# Patient Record
Sex: FEMALE | Race: WHITE | NOT HISPANIC OR LATINO | Employment: FULL TIME | URBAN - METROPOLITAN AREA
[De-identification: names, ages, dates, MRNs, and addresses within clinical notes are randomized per-mention and may not be internally consistent; named-entity substitution may affect disease eponyms.]

---

## 2019-06-06 ENCOUNTER — HOSPITAL ENCOUNTER (OUTPATIENT)
Dept: MAMMOGRAPHY | Facility: CLINIC | Age: 44
Discharge: HOME | End: 2019-06-06
Attending: NURSE PRACTITIONER | Admitting: NURSE PRACTITIONER
Payer: COMMERCIAL

## 2019-06-06 ENCOUNTER — OFFICE VISIT (OUTPATIENT)
Dept: INTERNAL MEDICINE | Facility: CLINIC | Age: 44
End: 2019-06-06
Payer: COMMERCIAL

## 2019-06-06 VITALS
HEIGHT: 67 IN | SYSTOLIC BLOOD PRESSURE: 102 MMHG | RESPIRATION RATE: 20 BRPM | TEMPERATURE: 98.1 F | WEIGHT: 146 LBS | DIASTOLIC BLOOD PRESSURE: 64 MMHG | OXYGEN SATURATION: 98 % | BODY MASS INDEX: 22.91 KG/M2 | HEART RATE: 78 BPM

## 2019-06-06 DIAGNOSIS — Z12.31 ENCOUNTER FOR SCREENING MAMMOGRAM FOR BREAST CANCER: ICD-10-CM

## 2019-06-06 DIAGNOSIS — Z00.01 ENCOUNTER FOR GENERAL ADULT MEDICAL EXAMINATION WITH ABNORMAL FINDINGS: Primary | ICD-10-CM

## 2019-06-06 DIAGNOSIS — F33.0 MILD EPISODE OF RECURRENT MAJOR DEPRESSIVE DISORDER (H): ICD-10-CM

## 2019-06-06 DIAGNOSIS — F41.9 ANXIETY: ICD-10-CM

## 2019-06-06 LAB
BASOPHILS # BLD AUTO: 0 10E9/L (ref 0–0.2)
BASOPHILS NFR BLD AUTO: 1 %
DIFFERENTIAL METHOD BLD: ABNORMAL
EOSINOPHIL # BLD AUTO: 0.1 10E9/L (ref 0–0.7)
EOSINOPHIL NFR BLD AUTO: 3 %
ERYTHROCYTE [DISTWIDTH] IN BLOOD BY AUTOMATED COUNT: 12.4 % (ref 10–15)
HCT VFR BLD AUTO: 40.7 % (ref 35–47)
HGB BLD-MCNC: 13.5 G/DL (ref 11.7–15.7)
LYMPHOCYTES # BLD AUTO: 1.8 10E9/L (ref 0.8–5.3)
LYMPHOCYTES NFR BLD AUTO: 60 %
MCH RBC QN AUTO: 30.5 PG (ref 26.5–33)
MCHC RBC AUTO-ENTMCNC: 33.2 G/DL (ref 31.5–36.5)
MCV RBC AUTO: 92 FL (ref 78–100)
MONOCYTES # BLD AUTO: 0.3 10E9/L (ref 0–1.3)
MONOCYTES NFR BLD AUTO: 11 %
NEUTROPHILS # BLD AUTO: 0.8 10E9/L (ref 1.6–8.3)
NEUTROPHILS NFR BLD AUTO: 25 %
PLATELET # BLD AUTO: 202 10E9/L (ref 150–450)
PLATELET # BLD EST: ABNORMAL 10*3/UL
RBC # BLD AUTO: 4.43 10E12/L (ref 3.8–5.2)
RBC MORPH BLD: ABNORMAL
WBC # BLD AUTO: 3 10E9/L (ref 4–11)

## 2019-06-06 PROCEDURE — 77067 SCR MAMMO BI INCL CAD: CPT

## 2019-06-06 PROCEDURE — 99386 PREV VISIT NEW AGE 40-64: CPT | Performed by: NURSE PRACTITIONER

## 2019-06-06 PROCEDURE — G0123 SCREEN CERV/VAG THIN LAYER: HCPCS | Performed by: NURSE PRACTITIONER

## 2019-06-06 PROCEDURE — 80061 LIPID PANEL: CPT | Performed by: NURSE PRACTITIONER

## 2019-06-06 PROCEDURE — 87624 HPV HI-RISK TYP POOLED RSLT: CPT | Performed by: NURSE PRACTITIONER

## 2019-06-06 PROCEDURE — 80050 GENERAL HEALTH PANEL: CPT | Performed by: NURSE PRACTITIONER

## 2019-06-06 PROCEDURE — 36415 COLL VENOUS BLD VENIPUNCTURE: CPT | Performed by: NURSE PRACTITIONER

## 2019-06-06 RX ORDER — BUPROPION HYDROCHLORIDE 300 MG/1
300 TABLET ORAL EVERY MORNING
COMMUNITY
End: 2019-07-15

## 2019-06-06 RX ORDER — CITALOPRAM HYDROBROMIDE 40 MG/1
40 TABLET ORAL DAILY
COMMUNITY
End: 2019-07-15

## 2019-06-06 SDOH — HEALTH STABILITY: MENTAL HEALTH: HOW OFTEN DO YOU HAVE A DRINK CONTAINING ALCOHOL?: MONTHLY OR LESS

## 2019-06-06 ASSESSMENT — ENCOUNTER SYMPTOMS
FEVER: 0
PARESTHESIAS: 0
WEAKNESS: 1
JOINT SWELLING: 0
NERVOUS/ANXIOUS: 1
DYSURIA: 0
SHORTNESS OF BREATH: 0
FREQUENCY: 0
CONSTIPATION: 0
MYALGIAS: 0
HEARTBURN: 0
HEADACHES: 0
DIARRHEA: 0
EYE PAIN: 0
ABDOMINAL PAIN: 0
DIZZINESS: 0
CHILLS: 0
ARTHRALGIAS: 0
BREAST MASS: 0
HEMATOCHEZIA: 0
SORE THROAT: 0
NAUSEA: 0
COUGH: 0
PALPITATIONS: 0
HEMATURIA: 0

## 2019-06-06 ASSESSMENT — MIFFLIN-ST. JEOR: SCORE: 1339.88

## 2019-06-06 ASSESSMENT — ANXIETY QUESTIONNAIRES
5. BEING SO RESTLESS THAT IT IS HARD TO SIT STILL: NOT AT ALL
2. NOT BEING ABLE TO STOP OR CONTROL WORRYING: SEVERAL DAYS
1. FEELING NERVOUS, ANXIOUS, OR ON EDGE: SEVERAL DAYS
IF YOU CHECKED OFF ANY PROBLEMS ON THIS QUESTIONNAIRE, HOW DIFFICULT HAVE THESE PROBLEMS MADE IT FOR YOU TO DO YOUR WORK, TAKE CARE OF THINGS AT HOME, OR GET ALONG WITH OTHER PEOPLE: SOMEWHAT DIFFICULT
7. FEELING AFRAID AS IF SOMETHING AWFUL MIGHT HAPPEN: NOT AT ALL
GAD7 TOTAL SCORE: 5
3. WORRYING TOO MUCH ABOUT DIFFERENT THINGS: SEVERAL DAYS
6. BECOMING EASILY ANNOYED OR IRRITABLE: SEVERAL DAYS

## 2019-06-06 ASSESSMENT — PATIENT HEALTH QUESTIONNAIRE - PHQ9
5. POOR APPETITE OR OVEREATING: SEVERAL DAYS
SUM OF ALL RESPONSES TO PHQ QUESTIONS 1-9: 8

## 2019-06-06 NOTE — PROGRESS NOTES
SUBJECTIVE:   CC: Candice Chopra is an 44 year old woman who presents for preventive health visit.     Healthy Habits:     Getting at least 3 servings of Calcium per day:  Yes    Bi-annual eye exam:  Yes    Dental care twice a year:  Yes    Sleep apnea or symptoms of sleep apnea:  None    Diet:  Vegetarian/vegan    Frequency of exercise:  4-5 days/week    Duration of exercise:  30-45 minutes    Taking medications regularly:  Yes    Medication side effects:  None    PHQ-2 Total Score: 0    Additional concerns today:  No              Today's PHQ-2 Score:   PHQ-2 ( 1999 Pfizer) 6/6/2019   Q1: Little interest or pleasure in doing things 0   Q2: Feeling down, depressed or hopeless 0   PHQ-2 Score 0   Q1: Little interest or pleasure in doing things Not at all   Q2: Feeling down, depressed or hopeless Not at all   PHQ-2 Score 0       Abuse: Current or Past(Physical, Sexual or Emotional)- No  Do you feel safe in your environment? Yes    Social History     Tobacco Use     Smoking status: Never Smoker     Smokeless tobacco: Never Used   Substance Use Topics     Alcohol use: Yes     Frequency: Monthly or less         Alcohol Use 6/6/2019   Prescreen: >3 drinks/day or >7 drinks/week? No       Reviewed orders with patient.  Reviewed health maintenance and updated orders accordingly - Yes          Pertinent mammograms are reviewed under the imaging tab.  History of abnormal Pap smear: NO - age 30-65 PAP every 5 years with negative HPV co-testing recommended     Reviewed and updated as needed this visit by clinical staff  Tobacco  Allergies  Meds  Med Hx  Surg Hx  Fam Hx  Soc Hx        Reviewed and updated as needed this visit by Provider  Allergies            Review of Systems   Constitutional: Negative for chills and fever.   HENT: Negative for congestion, ear pain, hearing loss and sore throat.    Eyes: Negative for pain and visual disturbance.   Respiratory: Negative for cough and shortness of breath.   "  Cardiovascular: Negative for chest pain, palpitations and peripheral edema.   Gastrointestinal: Negative for abdominal pain, constipation, diarrhea, heartburn, hematochezia and nausea.   Breasts:  Negative for tenderness, breast mass and discharge.   Genitourinary: Negative for dysuria, frequency, genital sores, hematuria, pelvic pain, urgency, vaginal bleeding and vaginal discharge.   Musculoskeletal: Negative for arthralgias, joint swelling and myalgias.   Skin: Negative for rash.   Neurological: Positive for weakness. Negative for dizziness, headaches and paresthesias.   Psychiatric/Behavioral: Negative for mood changes. The patient is nervous/anxious.      Needs referral to psych   Has been on same medication for a long time     PHQ-9 SCORE 6/6/2019   PHQ-9 Total Score 8     MICHAEL-7 SCORE 6/6/2019   Total Score 5     Periods more frequent than usual  Has an appointment       PAP done and she is at end of period so old blood present         OBJECTIVE:   /64   Pulse 78   Temp 98.1  F (36.7  C) (Oral)   Resp 20   Ht 1.702 m (5' 7\")   Wt 66.2 kg (146 lb)   LMP 06/01/2019 (Approximate)   SpO2 98%   Breastfeeding? No   BMI 22.87 kg/m    Physical Exam  GENERAL: alert and no distress  EYES: Eyes grossly normal to inspection, and conjunctivae and sclerae normal  HENT: ear canals and TM's normal, nose and mouth without ulcers or lesions  NECK: no adenopathy, no asymmetry, masses, or scars and thyroid normal to palpation  RESP: lungs clear to auscultation - no rales, rhonchi or wheezes  BREAST: normal without masses, tenderness or nipple discharge and no palpable axillary masses or adenopathy  CV: regular rate and rhythm, normal S1 S2, no S3 or S4, no murmur, click or rub, no peripheral edema and peripheral pulses strong  ABDOMEN: soft, nontender, no hepatosplenomegaly, no masses and bowel sounds normal   (female): normal female external genitalia, normal urethral meatus, vaginal mucosa pink, moist, well " "rugated, and normal cervix/adnexa/uterus without masses or discharge  MS: no gross musculoskeletal defects noted, no edema  SKIN: no suspicious lesions or rashes  NEURO: Normal strength and tone, mentation intact and speech normal  PSYCH: mentation appears normal, affect normal/bright    Diagnostic Test Results:  Labs reviewed in [facility]  Lab     ASSESSMENT/PLAN:   1. Encounter for general adult medical examination with abnormal findings    - SCREENING PAP SMEAR  - CBC with platelets and differential  - Lipid panel reflex to direct LDL Fasting  - Comprehensive metabolic panel  - TSH with free T4 reflex  - Pap imaged thin layer screen with HPV - recommended age 30 - 65 years (select HPV order below)    2. Mild episode of recurrent major depressive disorder (H)  Wants referral to mental health   She is currently on wellbutrin XL and celexa and doing well   - MENTAL HEALTH REFERRAL  - Adult; Psychiatry and Medication Management; Psychiatry; Other: [facility] [phone]; We will contact you to schedule the appointment or please call with any questions  - DEPRESSION ACTION PLAN (DAP)    3. Encounter for screening mammogram for breast cancer    - *MA Screening Digital Bilateral; Future    4. Anxiety  As above - on medication and doing well   - MENTAL HEALTH REFERRAL  - Adult; Psychiatry and Medication Management; Psychiatry; Other: [facility] [phone]; We will contact you to schedule the appointment or please call with any questions  - DEPRESSION ACTION PLAN (DAP)    COUNSELING:  Reviewed preventive health counseling, as reflected in patient instructions       Regular exercise       Healthy diet/nutrition       Osteoporosis Prevention/Bone Health    Estimated body mass index is 22.87 kg/m  as calculated from the following:    Height as of this encounter: 1.702 m (5' 7\").    Weight as of this encounter: 66.2 kg (146 lb).         reports that she has never smoked. She has " never used smokeless tobacco.      Counseling Resources:  ATP IV Guidelines  Pooled Cohorts Equation Calculator  Breast Cancer Risk Calculator  FRAX Risk Assessment  ICSI Preventive Guidelines  Dietary Guidelines for Americans, 2010  USDA's MyPlate  ASA Prophylaxis  Lung CA Screening    MAAME Collado CNP  [facility]

## 2019-06-06 NOTE — NURSING NOTE
"Chief Complaint   Patient presents with     Physical     fasting,pap     initial /64   Pulse 78   Temp 98.1  F (36.7  C) (Oral)   Resp 20   Ht 1.702 m (5' 7\")   Wt 66.2 kg (146 lb)   LMP 06/01/2019 (Approximate)   SpO2 98%   Breastfeeding? No   BMI 22.87 kg/m   Estimated body mass index is 22.87 kg/m  as calculated from the following:    Height as of this encounter: 1.702 m (5' 7\").    Weight as of this encounter: 66.2 kg (146 lb)..  bp completed using cuff size regular  SALVADOR CHASE LPN  "

## 2019-06-06 NOTE — PATIENT INSTRUCTIONS
Lab in [address]    To schedule your mammogram, you may call the breast center at [phone].     Mental health referral

## 2019-06-07 LAB
ALBUMIN SERPL-MCNC: 3.8 G/DL (ref 3.4–5)
ALP SERPL-CCNC: 55 U/L (ref 40–150)
ALT SERPL W P-5'-P-CCNC: 24 U/L (ref 0–50)
ANION GAP SERPL CALCULATED.3IONS-SCNC: 7 MMOL/L (ref 3–14)
AST SERPL W P-5'-P-CCNC: 19 U/L (ref 0–45)
BILIRUB SERPL-MCNC: 0.5 MG/DL (ref 0.2–1.3)
BUN SERPL-MCNC: 6 MG/DL (ref 7–30)
CALCIUM SERPL-MCNC: 8.6 MG/DL (ref 8.5–10.1)
CHLORIDE SERPL-SCNC: 109 MMOL/L (ref 94–109)
CHOLEST SERPL-MCNC: 153 MG/DL
CO2 SERPL-SCNC: 24 MMOL/L (ref 20–32)
CREAT SERPL-MCNC: 0.88 MG/DL (ref 0.52–1.04)
GFR SERPL CREATININE-BSD FRML MDRD: 79 ML/MIN/{1.73_M2}
GLUCOSE SERPL-MCNC: 84 MG/DL (ref 70–99)
HDLC SERPL-MCNC: 56 MG/DL
LDLC SERPL CALC-MCNC: 85 MG/DL
NONHDLC SERPL-MCNC: 97 MG/DL
POTASSIUM SERPL-SCNC: 4 MMOL/L (ref 3.4–5.3)
PROT SERPL-MCNC: 6.6 G/DL (ref 6.8–8.8)
SODIUM SERPL-SCNC: 140 MMOL/L (ref 133–144)
TRIGL SERPL-MCNC: 58 MG/DL
TSH SERPL DL<=0.005 MIU/L-ACNC: 0.94 MU/L (ref 0.4–4)

## 2019-06-07 ASSESSMENT — ANXIETY QUESTIONNAIRES: GAD7 TOTAL SCORE: 5

## 2019-06-11 LAB
COPATH REPORT: NORMAL
PAP: NORMAL

## 2019-06-13 LAB
FINAL DIAGNOSIS: NORMAL
HPV HR 12 DNA CVX QL NAA+PROBE: NEGATIVE
HPV16 DNA SPEC QL NAA+PROBE: NEGATIVE
HPV18 DNA SPEC QL NAA+PROBE: NEGATIVE
SPECIMEN DESCRIPTION: NORMAL
SPECIMEN SOURCE CVX/VAG CYTO: NORMAL

## 2019-06-14 DIAGNOSIS — D72.9 ABNORMAL WHITE BLOOD CELL (WBC) COUNT: Primary | ICD-10-CM

## 2019-07-15 ENCOUNTER — OFFICE VISIT (OUTPATIENT)
Dept: INTERNAL MEDICINE | Facility: CLINIC | Age: 44
End: 2019-07-15
Payer: COMMERCIAL

## 2019-07-15 VITALS
RESPIRATION RATE: 20 BRPM | HEART RATE: 85 BPM | DIASTOLIC BLOOD PRESSURE: 80 MMHG | BODY MASS INDEX: 22.24 KG/M2 | SYSTOLIC BLOOD PRESSURE: 112 MMHG | OXYGEN SATURATION: 100 % | TEMPERATURE: 98.3 F | WEIGHT: 142 LBS

## 2019-07-15 DIAGNOSIS — F41.1 GAD (GENERALIZED ANXIETY DISORDER): ICD-10-CM

## 2019-07-15 DIAGNOSIS — H61.21 IMPACTED CERUMEN OF RIGHT EAR: ICD-10-CM

## 2019-07-15 DIAGNOSIS — H91.91 DIMINISHED HEARING, RIGHT: Primary | ICD-10-CM

## 2019-07-15 PROCEDURE — 69210 REMOVE IMPACTED EAR WAX UNI: CPT | Mod: RT | Performed by: INTERNAL MEDICINE

## 2019-07-15 PROCEDURE — 99213 OFFICE O/P EST LOW 20 MIN: CPT | Mod: 25 | Performed by: INTERNAL MEDICINE

## 2019-07-15 RX ORDER — BUPROPION HYDROCHLORIDE 300 MG/1
300 TABLET ORAL EVERY MORNING
Qty: 30 TABLET | Refills: 0 | Status: SHIPPED | OUTPATIENT
Start: 2019-07-15 | End: 2021-04-26

## 2019-07-15 RX ORDER — CITALOPRAM HYDROBROMIDE 40 MG/1
40 TABLET ORAL DAILY
Qty: 30 TABLET | Refills: 0 | Status: SHIPPED | OUTPATIENT
Start: 2019-07-15

## 2019-07-15 ASSESSMENT — PATIENT HEALTH QUESTIONNAIRE - PHQ9
SUM OF ALL RESPONSES TO PHQ QUESTIONS 1-9: 4
5. POOR APPETITE OR OVEREATING: NOT AT ALL

## 2019-07-15 ASSESSMENT — ANXIETY QUESTIONNAIRES
5. BEING SO RESTLESS THAT IT IS HARD TO SIT STILL: NOT AT ALL
3. WORRYING TOO MUCH ABOUT DIFFERENT THINGS: SEVERAL DAYS
IF YOU CHECKED OFF ANY PROBLEMS ON THIS QUESTIONNAIRE, HOW DIFFICULT HAVE THESE PROBLEMS MADE IT FOR YOU TO DO YOUR WORK, TAKE CARE OF THINGS AT HOME, OR GET ALONG WITH OTHER PEOPLE: NOT DIFFICULT AT ALL
1. FEELING NERVOUS, ANXIOUS, OR ON EDGE: SEVERAL DAYS
2. NOT BEING ABLE TO STOP OR CONTROL WORRYING: SEVERAL DAYS
7. FEELING AFRAID AS IF SOMETHING AWFUL MIGHT HAPPEN: NOT AT ALL
6. BECOMING EASILY ANNOYED OR IRRITABLE: SEVERAL DAYS
GAD7 TOTAL SCORE: 4

## 2019-07-15 NOTE — PROGRESS NOTES
SUBJECTIVE:                                                      HPI: Candice Chopra is a pleasant 44 year old female who presents with muffled hearing:    - right-sided  - ongoing for the last 3 weeks  - no precipitating flight or underwater swimming  - has tried Debrox and OTC rinses-no improvement    - no ear pressure or pain  - no fevers or chills  - no current URI or allergy symptoms  - no ringing    Unrelated to above, patient needs refills of Celexa and Wellbutrin until she follows up with her PCP next month.  Anxiety well-controlled on current medications.    The medication, allergy, and problem lists have been reviewed and updated as appropriate.     OBJECTIVE:                                                      /80   Pulse 85   Temp 98.3  F (36.8  C) (Oral)   Resp 20   Wt 64.4 kg (142 lb)   LMP 07/01/2019 (Within Days)   SpO2 100%   BMI 22.24 kg/m    Constitutional: well-appearing  Right ear: normal pinna; unable to visualize EAC and TM due to impacted cerumen  Left ear: normal pinna, EAC, and TM    After obtaining verbal permission, a moderate amount of cerumen was removed via irrigation and lighted curette (by MD).    After procedure right EAC and TM visualized and normal. Patient reports resolution of presenting symptoms.    ASSESSMENT/PLAN:                                                      (H91.91) Diminished hearing, right  (primary encounter diagnosis)  (H61.21) Impacted cerumen of right ear  Comment: diminished hearing due to impacted cerumen; resolved with cerumen removal.    (F41.1) MICHAEL (generalized anxiety disorder)  Comment: well-controlled on Wellbutrin XL and Celexa.  Plan: CPM for now; refills provided; following up with PCP next month    The instructions on the AVS were discussed and explained to the patient. Patient expressed understanding of instructions.    (Chart documentation was completed, in part, with Eponym voice-recognition software. Even though reviewed, some  grammatical, spelling, and word errors may remain.)    Ashley Pedroza MD   [address] [address]  T: [phone], F: [phone]

## 2019-07-16 ASSESSMENT — ANXIETY QUESTIONNAIRES: GAD7 TOTAL SCORE: 4

## 2019-10-17 DIAGNOSIS — D72.9 ABNORMAL WHITE BLOOD CELL (WBC) COUNT: ICD-10-CM

## 2019-10-17 LAB
BASOPHILS # BLD AUTO: 0 10E9/L (ref 0–0.2)
BASOPHILS NFR BLD AUTO: 0.7 %
DIFFERENTIAL METHOD BLD: ABNORMAL
EOSINOPHIL # BLD AUTO: 0.2 10E9/L (ref 0–0.7)
EOSINOPHIL NFR BLD AUTO: 5.5 %
ERYTHROCYTE [DISTWIDTH] IN BLOOD BY AUTOMATED COUNT: 12.5 % (ref 10–15)
HCT VFR BLD AUTO: 40.3 % (ref 35–47)
HGB BLD-MCNC: 13.2 G/DL (ref 11.7–15.7)
LYMPHOCYTES # BLD AUTO: 1.7 10E9/L (ref 0.8–5.3)
LYMPHOCYTES NFR BLD AUTO: 56.3 %
MCH RBC QN AUTO: 31 PG (ref 26.5–33)
MCHC RBC AUTO-ENTMCNC: 32.8 G/DL (ref 31.5–36.5)
MCV RBC AUTO: 95 FL (ref 78–100)
MONOCYTES # BLD AUTO: 0.3 10E9/L (ref 0–1.3)
MONOCYTES NFR BLD AUTO: 10.2 %
NEUTROPHILS # BLD AUTO: 0.8 10E9/L (ref 1.6–8.3)
NEUTROPHILS NFR BLD AUTO: 27.3 %
PLATELET # BLD AUTO: 212 10E9/L (ref 150–450)
RBC # BLD AUTO: 4.26 10E12/L (ref 3.8–5.2)
WBC # BLD AUTO: 2.9 10E9/L (ref 4–11)

## 2019-10-17 PROCEDURE — 85025 COMPLETE CBC W/AUTO DIFF WBC: CPT | Performed by: NURSE PRACTITIONER

## 2019-10-17 PROCEDURE — 36415 COLL VENOUS BLD VENIPUNCTURE: CPT | Performed by: NURSE PRACTITIONER

## 2019-10-22 DIAGNOSIS — D72.818 OTHER DECREASED WHITE BLOOD CELL (WBC) COUNT: Primary | ICD-10-CM

## 2019-10-23 ENCOUNTER — TELEPHONE (OUTPATIENT)
Dept: ONCOLOGY | Facility: CLINIC | Age: 44
End: 2019-10-23

## 2019-10-23 NOTE — TELEPHONE ENCOUNTER
ONCOLOGY INTAKE: Records Information      APPT INFORMATION:  Referring provider:  Vianey Krueger  Referring provider s clinic:  [facility]  Reason for visit/diagnosis:  Other decreased white blood cell (WBC) count [D72.818]  Has patient been notified of appointment date and time?: NA    RECORDS INFORMATION:  Were the records received with the referral (via [employer])? Internal Referral    ADDITIONAL INFORMATION:  Left VM with hours and phone. Letter sent for follow up. Referral in Epic

## 2020-02-19 ENCOUNTER — MEDICAL CORRESPONDENCE (OUTPATIENT)
Dept: HEALTH INFORMATION MANAGEMENT | Facility: CLINIC | Age: 45
End: 2020-02-19

## 2020-03-13 DIAGNOSIS — F41.1 GENERALIZED ANXIETY DISORDER: Primary | ICD-10-CM

## 2020-03-13 DIAGNOSIS — F41.1 GENERALIZED ANXIETY DISORDER: ICD-10-CM

## 2020-03-13 DIAGNOSIS — F32.9 MAJOR DEPRESSIVE DISORDER, SINGLE EPISODE, UNSPECIFIED: ICD-10-CM

## 2020-03-13 LAB
BASOPHILS # BLD AUTO: 0 10E9/L (ref 0–0.2)
BASOPHILS NFR BLD AUTO: 0.5 %
DIFFERENTIAL METHOD BLD: NORMAL
EOSINOPHIL # BLD AUTO: 0.1 10E9/L (ref 0–0.7)
EOSINOPHIL NFR BLD AUTO: 2.4 %
ERYTHROCYTE [DISTWIDTH] IN BLOOD BY AUTOMATED COUNT: 12.5 % (ref 10–15)
HCT VFR BLD AUTO: 40.7 % (ref 35–47)
HGB BLD-MCNC: 13.3 G/DL (ref 11.7–15.7)
LYMPHOCYTES # BLD AUTO: 1.9 10E9/L (ref 0.8–5.3)
LYMPHOCYTES NFR BLD AUTO: 45.2 %
MCH RBC QN AUTO: 30.2 PG (ref 26.5–33)
MCHC RBC AUTO-ENTMCNC: 32.7 G/DL (ref 31.5–36.5)
MCV RBC AUTO: 93 FL (ref 78–100)
MONOCYTES # BLD AUTO: 0.4 10E9/L (ref 0–1.3)
MONOCYTES NFR BLD AUTO: 8.4 %
NEUTROPHILS # BLD AUTO: 1.8 10E9/L (ref 1.6–8.3)
NEUTROPHILS NFR BLD AUTO: 43.5 %
PLATELET # BLD AUTO: 220 10E9/L (ref 150–450)
RBC # BLD AUTO: 4.4 10E12/L (ref 3.8–5.2)
WBC # BLD AUTO: 4.2 10E9/L (ref 4–11)

## 2020-03-13 PROCEDURE — 85025 COMPLETE CBC W/AUTO DIFF WBC: CPT | Performed by: NURSE PRACTITIONER

## 2020-03-13 PROCEDURE — 36415 COLL VENOUS BLD VENIPUNCTURE: CPT | Performed by: NURSE PRACTITIONER

## 2021-04-25 ENCOUNTER — HEALTH MAINTENANCE LETTER (OUTPATIENT)
Age: 46
End: 2021-04-25

## 2021-04-26 ENCOUNTER — OFFICE VISIT (OUTPATIENT)
Dept: INTERNAL MEDICINE | Facility: CLINIC | Age: 46
End: 2021-04-26
Payer: COMMERCIAL

## 2021-04-26 ENCOUNTER — TELEPHONE (OUTPATIENT)
Dept: INTERNAL MEDICINE | Facility: CLINIC | Age: 46
End: 2021-04-26

## 2021-04-26 VITALS
SYSTOLIC BLOOD PRESSURE: 109 MMHG | OXYGEN SATURATION: 97 % | TEMPERATURE: 98.2 F | HEART RATE: 96 BPM | DIASTOLIC BLOOD PRESSURE: 67 MMHG | RESPIRATION RATE: 20 BRPM | BODY MASS INDEX: 24.18 KG/M2 | WEIGHT: 154.4 LBS

## 2021-04-26 DIAGNOSIS — R14.0 ABDOMINAL BLOATING: ICD-10-CM

## 2021-04-26 DIAGNOSIS — N76.0 VAGINITIS AND VULVOVAGINITIS: Primary | ICD-10-CM

## 2021-04-26 LAB
SPECIMEN SOURCE: NORMAL
WET PREP SPEC: NORMAL

## 2021-04-26 PROCEDURE — 87210 SMEAR WET MOUNT SALINE/INK: CPT | Performed by: NURSE PRACTITIONER

## 2021-04-26 PROCEDURE — 99214 OFFICE O/P EST MOD 30 MIN: CPT | Performed by: NURSE PRACTITIONER

## 2021-04-26 RX ORDER — DULOXETIN HYDROCHLORIDE 60 MG/1
60 CAPSULE, DELAYED RELEASE ORAL DAILY
COMMUNITY
Start: 2020-02-19

## 2021-04-26 NOTE — NURSING NOTE
/67 (BP Location: Right arm, Patient Position: Sitting, Cuff Size: Adult Regular)   Pulse 96   Temp 98.2  F (36.8  C) (Oral)   Resp 20   Wt 70 kg (154 lb 6.4 oz)   LMP 04/12/2021   SpO2 97%   BMI 24.18 kg/m

## 2021-04-26 NOTE — PROGRESS NOTES
Assessment & Plan     (N76.0) Vaginitis and vulvovaginitis  (primary encounter diagnosis)  Comment:   Plan: Wet prep            (R14.0) Abdominal bloating  Comment:   Plan: Miralax daily, consider GI consult                   No follow-ups on file.    Christen Srivastava NP  [facility] CALLY Harvey is a 46 year old who presents for the following health issues     HPI     Abdominal/Flank Pain  Onset/Duration: 2 months  Description:   Character: Dull ache, Fullness and Cramping  Location: all quads  Radiation: None  Intensity: mild, moderate  Progression of Symptoms:  intermittent  Accompanying Signs & Symptoms:  Fever/Chills: no  Gas/Bloating: YES  Nausea: no  Vomitting: no  Diarrhea: no  Constipation: YES- sometimes  Dysuria or Hematuria: no  History:   Trauma: no  Previous similar pain: no  Previous tests done: none  Precipitating factors:   Does the pain change with:     Food: no    Bowel Movement: no    Urination: no   Other factors:  no  Therapies tried and outcome: None  Patient's last menstrual period was 04/12/2021.      Vaginal Symptoms  Onset/Duration: 2 months  Description:  Vaginal Discharge: white creamy   Itching (Pruritis): no  Burning sensation:  no  Odor: no  Accompanying Signs & Symptoms:  Urinary symptoms: no  Abdominal pain: YES  Fever: no  History:   Sexually active: no  New Partner: no  Possibility of Pregnancy:  no  Recent antibiotic use: no  Previous vaginitis issues: no  Precipitating or alleviating factors: None  Therapies tried and outcome: Monistat          Review of Systems   Constitutional, HEENT, cardiovascular, pulmonary, gi and gu systems are negative, except as otherwise noted.      Objective    /67 (BP Location: Right arm, Patient Position: Sitting, Cuff Size: Adult Regular)   Pulse 96   Temp 98.2  F (36.8  C) (Oral)   Resp 20   Wt 70 kg (154 lb 6.4 oz)   LMP 04/12/2021   SpO2 97%   BMI 24.18 kg/m      Physical Exam   GENERAL:  healthy, alert and no distress  ABDOMEN: soft, nontender, no hepatosplenomegaly, no masses and bowel sounds normal   (female): normal female external genitalia, normal urethral meatus  and vaginal mucosa pink, moist, well rugated  PSYCH: mentation appears normal and anxious

## 2021-04-26 NOTE — TELEPHONE ENCOUNTER
Please advise pt wet prep showed no yeast, or BV.  Consult GYN for further concerns.  Christen Srivastava CNP

## 2021-05-06 ENCOUNTER — HOSPITAL ENCOUNTER (OUTPATIENT)
Dept: MAMMOGRAPHY | Facility: CLINIC | Age: 46
Discharge: HOME | End: 2021-05-06
Attending: NURSE PRACTITIONER | Admitting: NURSE PRACTITIONER
Payer: COMMERCIAL

## 2021-05-06 DIAGNOSIS — Z12.31 SCREENING MAMMOGRAM FOR HIGH-RISK PATIENT: ICD-10-CM

## 2021-05-06 PROCEDURE — 77067 SCR MAMMO BI INCL CAD: CPT

## 2021-05-25 ENCOUNTER — MYC MEDICAL ADVICE (OUTPATIENT)
Dept: INTERNAL MEDICINE | Facility: CLINIC | Age: 46
End: 2021-05-25

## 2021-05-28 ENCOUNTER — VIRTUAL VISIT (OUTPATIENT)
Dept: FAMILY MEDICINE | Facility: CLINIC | Age: 46
End: 2021-05-28
Payer: COMMERCIAL

## 2021-05-28 DIAGNOSIS — T50.Z95A VACCINE REACTION, INITIAL ENCOUNTER: Primary | ICD-10-CM

## 2021-05-28 PROCEDURE — 99213 OFFICE O/P EST LOW 20 MIN: CPT | Mod: 95 | Performed by: NURSE PRACTITIONER

## 2021-05-28 NOTE — LETTER
[facility]  [address] [place] [id number]  Phone: [phone]  Fax: [phone]        May 28, 2021      Re: Candice Chopra                                                                                                                                [address]  [facility] [address]        To whom it may concern,     Candice Chopra  was seen on May 28, 2021.    Please excuse her until June 1st, 2021 due to vaccine reaction.      Thank you,      Yin RIOS-BC / teodoro

## 2021-05-28 NOTE — PATIENT INSTRUCTIONS
Here is the website to report your vaccine reaction to pfizer if desired    [email or web address]

## 2021-05-28 NOTE — LETTER
May 28, 2021      Candice Chopra  [address]  [facility] [address]        To Whom It May Concern:    Candice Chopra  was seen on May 28, 2021.    Please excuse her until June 1st, 2012 due to vaccine reaction.        Sincerely,        MAAME Gleason CNP

## 2021-05-28 NOTE — PROGRESS NOTES
Candice is a 46 year old who is being evaluated via a billable video visit.      How would you like to obtain your AVS? MyChart  If the video visit is dropped, the invitation should be resent by: Text to cell phone: [phone]  Will anyone else be joining your video visit? No      Video Start Time: 811  amwell no audio  Switched to doximity.. no     Assessment & Plan     Vaccine reaction, initial encounter  Rest, off work until Tuesday.   Contact [employer] with reaction information if desired.   Tylenol as needed for arm discomfort/fever     19 minutes spent on the date of the encounter doing chart review, history and exam, documentation and further activities per the note  Call or return to the clinic with any worsening of symptoms or no resolution. Patient/Parent verbalized understanding and is in agreement. Medication side effects reviewed.   Current Outpatient Medications   Medication Sig Dispense Refill     citalopram (CELEXA) 40 MG tablet Take 1 tablet (40 mg) by mouth daily 30 tablet 0     DULoxetine (CYMBALTA) 60 MG capsule Take 60 mg by mouth daily       As the provider for this telephone/video service, I attest that I introduced myself to the patient, provided my credentials, disclosed my location, and determined that, based on a review of the patient's chart and/or a discussion with members of the patient's treatment team, a telephone/video visit is an appropriate and effective means of providing this service. The patient and I mutually agree that this visit is appropriate for telephone/video visit as well.    MAAME Gleason CNP  [facility]    Subjective   Candice is a 46 year old who presents for the following health issues     HPI     Reaction to   COVID vaccine - Pfizer 5/22/2021 second dose  Recurring symptoms. Really tired. Wanting to sleep a lot  Employer needs a note to go back to work   Trouble with brain fog. 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       New Patient/    Review of Systems   Constitutional, HEENT, cardiovascular, pulmonary, GI, , musculoskeletal, neuro, skin, endocrine and psych systems are negative, except as otherwise noted.      Objective           Vitals:  No vitals were obtained today due to virtual visit.    Physical Exam   GENERAL: Healthy, alert and no distress  EYES: Eyes grossly normal to inspection.  No discharge or erythema, or obvious scleral/conjunctival abnormalities.  RESP: No audible wheeze, cough, or visible cyanosis.  No visible retractions or increased work of breathing.    SKIN: Visible skin clear. No significant rash, abnormal pigmentation or lesions.  NEURO: Cranial nerves grossly intact.  Mentation and speech appropriate for age.  PSYCH: Mentation appears normal, affect normal/bright, judgement and insight intact, normal speech and appearance well-groomed.    Office Visit on 04/26/2021   Component Date Value Ref Range Status     Specimen Description 04/26/2021 Vagina   Final     Wet Prep 04/26/2021 No Trichomonas seen   Final     Wet Prep 04/26/2021 No yeast seen   Final     Wet Prep 04/26/2021 No clue cells seen   Final     Wet Prep 04/26/2021    Final                    Value:WBC'S seen  Moderate                   Video-Visit Details    Type of service:  Video Visit    Video End Time:826    Originating Location (pt. Location): Home    Distant Location (provider location):  [facility]     Platform used for Video Visit: Baljeet

## 2021-10-10 ENCOUNTER — HEALTH MAINTENANCE LETTER (OUTPATIENT)
Age: 46
End: 2021-10-10

## 2022-05-21 ENCOUNTER — HEALTH MAINTENANCE LETTER (OUTPATIENT)
Age: 47
End: 2022-05-21

## 2022-07-16 ENCOUNTER — HEALTH MAINTENANCE LETTER (OUTPATIENT)
Age: 47
End: 2022-07-16

## 2022-09-18 ENCOUNTER — HEALTH MAINTENANCE LETTER (OUTPATIENT)
Age: 47
End: 2022-09-18

## 2022-12-30 ENCOUNTER — LAB (OUTPATIENT)
Dept: LAB | Facility: CLINIC | Age: 47
End: 2022-12-30
Payer: COMMERCIAL

## 2022-12-30 DIAGNOSIS — F32.9 MAJOR DEPRESSIVE DISORDER, SINGLE EPISODE, UNSPECIFIED: ICD-10-CM

## 2022-12-30 DIAGNOSIS — Z00.00 LABORATORY EXAMINATION ORDERED AS PART OF A ROUTINE GENERAL MEDICAL EXAMINATION: Primary | ICD-10-CM

## 2022-12-30 LAB
ALBUMIN SERPL BCG-MCNC: 4.2 G/DL (ref 3.5–5.2)
ALP SERPL-CCNC: 63 U/L (ref 35–104)
ALT SERPL W P-5'-P-CCNC: 19 U/L (ref 10–35)
ANION GAP SERPL CALCULATED.3IONS-SCNC: 11 MMOL/L (ref 7–15)
AST SERPL W P-5'-P-CCNC: 20 U/L (ref 10–35)
BILIRUB SERPL-MCNC: 0.6 MG/DL
BUN SERPL-MCNC: 17.1 MG/DL (ref 6–20)
CALCIUM SERPL-MCNC: 9.2 MG/DL (ref 8.6–10)
CHLORIDE SERPL-SCNC: 104 MMOL/L (ref 98–107)
CREAT SERPL-MCNC: 0.76 MG/DL (ref 0.51–0.95)
DEPRECATED HCO3 PLAS-SCNC: 24 MMOL/L (ref 22–29)
ERYTHROCYTE [DISTWIDTH] IN BLOOD BY AUTOMATED COUNT: 11.9 % (ref 10–15)
FERRITIN SERPL-MCNC: 27 NG/ML (ref 6–175)
GFR SERPL CREATININE-BSD FRML MDRD: >90 ML/MIN/1.73M2
GLUCOSE SERPL-MCNC: 110 MG/DL (ref 70–99)
HCT VFR BLD AUTO: 42.9 % (ref 35–47)
HGB BLD-MCNC: 14.2 G/DL (ref 11.7–15.7)
MCH RBC QN AUTO: 30.3 PG (ref 26.5–33)
MCHC RBC AUTO-ENTMCNC: 33.1 G/DL (ref 31.5–36.5)
MCV RBC AUTO: 92 FL (ref 78–100)
PLATELET # BLD AUTO: 254 10E3/UL (ref 150–450)
POTASSIUM SERPL-SCNC: 3.8 MMOL/L (ref 3.4–5.3)
PROT SERPL-MCNC: 6.8 G/DL (ref 6.4–8.3)
RBC # BLD AUTO: 4.68 10E6/UL (ref 3.8–5.2)
SODIUM SERPL-SCNC: 139 MMOL/L (ref 136–145)
TSH SERPL DL<=0.005 MIU/L-ACNC: 1.7 UIU/ML (ref 0.3–4.2)
WBC # BLD AUTO: 4.9 10E3/UL (ref 4–11)

## 2022-12-30 PROCEDURE — 85027 COMPLETE CBC AUTOMATED: CPT

## 2022-12-30 PROCEDURE — 82306 VITAMIN D 25 HYDROXY: CPT

## 2022-12-30 PROCEDURE — 36415 COLL VENOUS BLD VENIPUNCTURE: CPT

## 2022-12-30 PROCEDURE — 80053 COMPREHEN METABOLIC PANEL: CPT

## 2022-12-30 PROCEDURE — 82728 ASSAY OF FERRITIN: CPT

## 2022-12-30 PROCEDURE — 84443 ASSAY THYROID STIM HORMONE: CPT

## 2022-12-31 LAB — DEPRECATED CALCIDIOL+CALCIFEROL SERPL-MC: 34 UG/L (ref 20–75)

## 2023-06-04 ENCOUNTER — HEALTH MAINTENANCE LETTER (OUTPATIENT)
Age: 48
End: 2023-06-04

## 2023-07-30 ENCOUNTER — HEALTH MAINTENANCE LETTER (OUTPATIENT)
Age: 48
End: 2023-07-30

## 2023-08-23 ENCOUNTER — HOSPITAL ENCOUNTER (OUTPATIENT)
Dept: MAMMOGRAPHY | Facility: CLINIC | Age: 48
Discharge: HOME | End: 2023-08-23
Attending: PHYSICIAN ASSISTANT | Admitting: PHYSICIAN ASSISTANT
Payer: COMMERCIAL

## 2023-08-23 DIAGNOSIS — Z12.31 VISIT FOR SCREENING MAMMOGRAM: ICD-10-CM

## 2023-08-23 PROCEDURE — 77067 SCR MAMMO BI INCL CAD: CPT

## 2023-08-30 PROBLEM — F90.9 ADHD (ATTENTION DEFICIT HYPERACTIVITY DISORDER): Status: ACTIVE | Noted: 2023-03-23

## 2024-07-14 ENCOUNTER — HEALTH MAINTENANCE LETTER (OUTPATIENT)
Age: 49
End: 2024-07-14

## 2025-07-19 ENCOUNTER — HEALTH MAINTENANCE LETTER (OUTPATIENT)
Age: 50
End: 2025-07-19